# Patient Record
Sex: MALE | Race: WHITE | Employment: OTHER | URBAN - METROPOLITAN AREA
[De-identification: names, ages, dates, MRNs, and addresses within clinical notes are randomized per-mention and may not be internally consistent; named-entity substitution may affect disease eponyms.]

---

## 2017-04-18 ENCOUNTER — OFFICE VISIT (OUTPATIENT)
Dept: INTERNAL MEDICINE CLINIC | Age: 52
End: 2017-04-18

## 2017-04-18 VITALS
WEIGHT: 204.9 LBS | DIASTOLIC BLOOD PRESSURE: 92 MMHG | HEIGHT: 70 IN | SYSTOLIC BLOOD PRESSURE: 135 MMHG | TEMPERATURE: 98.5 F | OXYGEN SATURATION: 97 % | BODY MASS INDEX: 29.33 KG/M2 | HEART RATE: 100 BPM | RESPIRATION RATE: 20 BRPM

## 2017-04-18 DIAGNOSIS — M54.2 CHRONIC NECK PAIN: ICD-10-CM

## 2017-04-18 DIAGNOSIS — G89.29 CHRONIC NECK PAIN: ICD-10-CM

## 2017-04-18 DIAGNOSIS — M48.02 CERVICAL SPINAL STENOSIS: Primary | ICD-10-CM

## 2017-04-18 RX ORDER — OXYCODONE AND ACETAMINOPHEN 5; 325 MG/1; MG/1
1 TABLET ORAL
Qty: 120 TAB | Refills: 0 | Status: SHIPPED | OUTPATIENT
Start: 2017-04-18 | End: 2017-05-18 | Stop reason: SDUPTHER

## 2017-04-18 NOTE — PROGRESS NOTES
Chema Velasquez is a 45 y.o. male and presents with Referral Request (pain mgmt)  . Last pain pill was this am and yest. Is planning to see Dr. Bettylou Cheadle and needs a referral for his cervical stenosis. Review of Systems  Constitutional: negative for fevers, chills, anorexia and weight loss  Eyes:   negative for visual disturbance and irritation  ENT:   negative for tinnitus,sore throat,nasal congestion,ear pains. hoarseness  Respiratory:  negative for cough, hemoptysis, dyspnea,wheezing  CV:   negative for chest pain, palpitations, lower extremity edema  GI:   negative for nausea, vomiting, diarrhea, abdominal pain,melena  Endo:               negative for polyuria,polydipsia,polyphagia,heat intolerance  Genitourinary: negative for frequency, dysuria and hematuria  Integument:  negative for rash and pruritus  Hematologic:  negative for easy bruising and gum/nose bleeding  Musculoskel: negative for myalgias, arthralgias, back pain, muscle weakness, joint pain  Neurological:  negative for headaches, dizziness, vertigo, memory problems and gait   Behavl/Psych: negative for feelings of anxiety, depression, mood changes    Past Medical History:   Diagnosis Date    Congestive heart failure, NYHA class II ([facility] [address] 6/2014    stage b    Depression     depression    DJD (degenerative joint disease), cervical     Sleep disorder     no longer needs cpap     Past Surgical History:   Procedure Laterality Date    HX ORTHOPAEDIC       C5-6 disc fusion and thumb surgery     Social History     Social History    Marital status:      Spouse name: N/A    Number of children: N/A    Years of education: N/A     Social History Main Topics    Smoking status: Never Smoker    Smokeless tobacco: Never Used    Alcohol use No      Comment: occassionally    Drug use: No    Sexual activity: Yes     Partners: Female     Birth control/ protection: None     Other Topics Concern    None     Social History Narrative    Lives with wife and 32year old son. Current Outpatient Prescriptions   Medication Sig Dispense Refill    oxyCODONE-acetaminophen (PERCOCET) 5-325 mg per tablet Take 1 Tab by mouth every six (6) hours as needed for Pain. Max Daily Amount: 4 Tabs. 120 Tab 0    lisinopril (PRINIVIL, ZESTRIL) 40 mg tablet Take 1 Tab by mouth daily. 30 Tab 0    carvedilol (COREG) 25 mg tablet Take 2 Tabs by mouth two (2) times daily (with meals). 120 Tab 5    spironolactone (ALDACTONE) 25 mg tablet Take  by mouth daily. Allergies   Allergen Reactions    Shellfish Containing Products Hives and Anaphylaxis    Iodine Swelling       Objective:  Visit Vitals    BP (!) 135/92 (BP 1 Location: Left arm)    Pulse 100    Temp 98.5 °F (36.9 °C) (Oral)    Resp 20    Ht 5' 10\" (1.778 m)    Wt 204 lb 14.4 oz (92.9 kg)    SpO2 97%    BMI 29.4 kg/m2     Physical Exam:   General appearance - alert, well appearing, and in no distress  Mental status - alert, oriented to person, place, and time  Chest - clear to auscultation, no wheezes, rales or rhonchi, symmetric air entry   Heart - normal rate, regular rhythm, normal S1, S2, no murmurs, rubs, clicks or gallops   Abdomen - soft, nontender, nondistended, no masses or organomegaly  Lymph- no adenopathy palpable  Ext-peripheral pulses normal, no pedal edema, no clubbing or cyanosis  Skin-Warm and dry. no hyperpigmentation, vitiligo, or suspicious lesions  Neuro -alert, oriented, normal speech, no focal findings or movement disorder noted    Assessment/Plan:    ICD-10-CM ICD-9-CM    1. Cervical spinal stenosis M48.02 723.0 REFERRAL TO PAIN CLINIC      PAIN MGMT PANEL W/REFL, UR   2.  Chronic neck pain M54.2 723.1 PAIN MGMT PANEL W/REFL, UR    G89.29 338.29 oxyCODONE-acetaminophen (PERCOCET) 5-325 mg per tablet     Orders Placed This Encounter    PAIN MGMT PANEL W/REFL, UR    REFERRAL TO PAIN CLINIC     Referral Priority:   Routine     Referral Type:   Consultation     Referral Reason:   Specialty Services Required     Referral Location:   [facility] Referred to Provider:   Robert Garrett DO    oxyCODONE-acetaminophen (PERCOCET) 5-325 mg per tablet     Sig: Take 1 Tab by mouth every six (6) hours as needed for Pain. Max Daily Amount: 4 Tabs. Dispense:  120 Tab     Refill:  0     Cervical stenosis-Pain med referral given.  was reviewed and medication use is appropriate. Last dosage in  was perc 7.5/325 but our last dosage was perc 5/325mg q6h. Refilled him at our last dose and explained this was a one time fill. Perc 5/325 #120 with no refills.        Follow-up Disposition: Not on File

## 2017-04-18 NOTE — MR AVS SNAPSHOT
Visit Information Date & Time Provider Department Dept. Phone Encounter #  
 4/18/2017  1:30 PM Janelle Busby, [address] [id number] Upcoming Health Maintenance Date Due COLONOSCOPY 1/30/2019 DTaP/Tdap/Td series (2 - Td) 2/10/2026 Allergies as of 4/18/2017  Review Complete On: 4/18/2017 By: Janelle Busby MD  
  
 Severity Noted Reaction Type Reactions Shellfish Containing Products High 07/22/2015    Hives, Anaphylaxis Iodine  07/27/2010    Swelling Current Immunizations  Reviewed on 2/10/2016 Name Date Influenza Vaccine Intradermal PF 10/14/2015 Pneumococcal Vaccine (Unspecified Type) 8/10/2014 Tdap 2/10/2016 Not reviewed this visit You Were Diagnosed With   
  
 Codes Comments Cervical spinal stenosis    -  Primary ICD-10-CM: M48.02 
ICD-9-CM: 723.0 Chronic neck pain     ICD-10-CM: M54.2, G89.29 ICD-9-CM: 723.1, 338.29 Vitals BP Pulse Temp Resp Height(growth percentile) Weight(growth percentile) (!) 135/92 (BP 1 Location: Left arm) 100 98.5 °F (36.9 °C) (Oral) 20 5' 10\" (1.778 m) 204 lb 14.4 oz (92.9 kg) SpO2 BMI Smoking Status 97% 29.4 kg/m2 Never Smoker Vitals History BMI and BSA Data Body Mass Index Body Surface Area  
 29.4 kg/m 2 2.14 m 2 Preferred Pharmacy Pharmacy Name Phone [address], [address] [place] [address] [phone] Your Updated Medication List  
  
   
This list is accurate as of: 4/18/17  3:09 PM.  Always use your most recent med list.  
  
  
  
  
 carvedilol 25 mg tablet Commonly known as:  Neto Flatten Take 2 Tabs by mouth two (2) times daily (with meals). lisinopril 40 mg tablet Commonly known as:  Hserman Mash Take 1 Tab by mouth daily. oxyCODONE-acetaminophen 5-325 mg per tablet Commonly known as:  PERCOCET  
 Take 1 Tab by mouth every six (6) hours as needed for Pain. Max Daily Amount: 4 Tabs. spironolactone 25 mg tablet Commonly known as:  ALDACTONE Take  by mouth daily. Prescriptions Printed Refills  
 oxyCODONE-acetaminophen (PERCOCET) 5-325 mg per tablet 0 Sig: Take 1 Tab by mouth every six (6) hours as needed for Pain. Max Daily Amount: 4 Tabs. Class: Print Route: Oral  
  
We Performed the Following PAIN MGMT PANEL W/REFL, UR [[id number] Custom] REFERRAL TO PAIN CLINIC [QVO15 Custom] Comments:  
 Please evaluate patient for cervical stenosis. Referral Information Referral ID Referred By Referred To  
  
 [id number] Marisol ROMERO [facility] [address] Visits Status Start Date End Date 1 New Request 4/18/17 4/18/18 If your referral has a status of pending review or denied, additional information will be sent to support the outcome of this decision. Introducing [facility] & HEALTH SERVICES! [facility] introduces combionic patient portal. Now you can access parts of your medical record, email your doctor's office, and request medication refills online. 1. In your internet browser, go to [email or web address] 2. Click on the First Time User? Click Here link in the Sign In box. You will see the New Member Sign Up page. 3. Enter your combionic Access Code exactly as it appears below. You will not need to use this code after youve completed the sign-up process. If you do not sign up before the expiration date, you must request a new code. · combionic Access Code: [email or web address] Expires: 7/17/2017  3:06 PM 
 
4. Enter the last four digits of your Social Security Number (xxxx) and Date of Birth (mm/dd/yyyy) as indicated and click Submit. You will be taken to the next sign-up page. 5. Create a combionic ID.  This will be your combionic login ID and cannot be changed, so think of one that is secure and easy to remember. 6. Create a ThisNext password. You can change your password at any time. 7. Enter your Password Reset Question and Answer. This can be used at a later time if you forget your password. 8. Enter your e-mail address. You will receive e-mail notification when new information is available in [address] 9. Click Sign Up. You can now view and download portions of your medical record. 10. Click the Download Summary menu link to download a portable copy of your medical information. If you have questions, please visit the Frequently Asked Questions section of the ThisNext website. Remember, ThisNext is NOT to be used for urgent needs. For medical emergencies, dial 911. Now available from your iPhone and Android! Please provide this summary of care documentation to your next provider. Your primary care clinician is listed as Taj Rule. If you have any questions after today's visit, please call [phone].

## 2017-04-18 NOTE — PROGRESS NOTES
1. Have you been to the ER, urgent care clinic since your last visit? Hospitalized since your last visit? No    2. Have you seen or consulted any other health care providers outside of the [facility] since your last visit? Include any pap smears or colon screening.  Yes When: Dr. Mya Klein [facility] ([place])     Patient states pain of 7 in neck and right knee, last taken something for pain this am.    Patient is here for:  Chief Complaint   Patient presents with   Willian Downey Referral Request     pain mgmt

## 2017-04-24 ENCOUNTER — TELEPHONE (OUTPATIENT)
Dept: INTERNAL MEDICINE CLINIC | Age: 52
End: 2017-04-24

## 2017-04-24 LAB
AMPHETAMINES UR QL SCN: NEGATIVE NG/ML
BARBITURATES UR QL SCN: NEGATIVE NG/ML
BENZODIAZ UR QL SCN: NEGATIVE NG/ML
BZE UR QL SCN: NEGATIVE NG/ML
CANNABINOIDS UR QL SCN: NEGATIVE NG/ML
CREAT UR-MCNC: 158 MG/DL (ref 20–300)
FENTANYL+NORFENTANYL UR QL SCN: NEGATIVE PG/ML
MEPERIDINE UR QL: NEGATIVE NG/ML
METHADONE UR QL SCN: NEGATIVE NG/ML
OPIATES UR QL SCN: NEGATIVE NG/ML
OXYCODONE+OXYMORPHONE UR QL SCN: POSITIVE
PCP UR QL: NEGATIVE NG/ML
PH UR: 5.8 [PH] (ref 4.5–8.9)
PLEASE NOTE:, 733157: ABNORMAL
PROPOXYPH UR QL SCN: NEGATIVE NG/ML
SP GR UR: 1.03
TRAMADOL UR QL SCN: NEGATIVE NG/ML

## 2017-04-24 NOTE — TELEPHONE ENCOUNTER
Pt's wife, Odell Lazo called stating  The specialist you referred him to  Does not do pain management. He is for spine intervention. They did find another physician, Dr. Rajendra Gonzalez who needs ov notes for last 3 months on this pt. As well as MRI and CT results. Dr. Rip Rosales ph# [phone] and fax# .  Pt # [phone]

## 2017-04-26 NOTE — TELEPHONE ENCOUNTER
Writer fax notes/MRI/CT scan results to DR. Dannie Hernández office @ [phone]/call patient @ [phone] unable to no answer unable to reach patient @ this time.

## 2017-05-18 ENCOUNTER — OFFICE VISIT (OUTPATIENT)
Dept: INTERNAL MEDICINE CLINIC | Age: 52
End: 2017-05-18

## 2017-05-18 VITALS
WEIGHT: 202 LBS | RESPIRATION RATE: 20 BRPM | HEIGHT: 70 IN | SYSTOLIC BLOOD PRESSURE: 116 MMHG | DIASTOLIC BLOOD PRESSURE: 85 MMHG | OXYGEN SATURATION: 96 % | TEMPERATURE: 98 F | HEART RATE: 90 BPM | BODY MASS INDEX: 28.92 KG/M2

## 2017-05-18 DIAGNOSIS — G47.00 INSOMNIA, UNSPECIFIED TYPE: ICD-10-CM

## 2017-05-18 DIAGNOSIS — G89.29 CHRONIC NECK PAIN: ICD-10-CM

## 2017-05-18 DIAGNOSIS — M54.2 CHRONIC NECK PAIN: ICD-10-CM

## 2017-05-18 DIAGNOSIS — M48.02 CERVICAL SPINAL STENOSIS: Primary | ICD-10-CM

## 2017-05-18 RX ORDER — OXYCODONE AND ACETAMINOPHEN 5; 325 MG/1; MG/1
1 TABLET ORAL
Qty: 120 TAB | Refills: 0 | Status: SHIPPED | OUTPATIENT
Start: 2017-05-18 | End: 2017-05-18 | Stop reason: SDUPTHER

## 2017-05-18 RX ORDER — OXYCODONE AND ACETAMINOPHEN 5; 325 MG/1; MG/1
1 TABLET ORAL
Qty: 120 TAB | Refills: 0 | Status: SHIPPED | OUTPATIENT
Start: 2017-05-18 | End: 2017-07-14

## 2017-05-18 NOTE — PROGRESS NOTES
Nany Shelton is a 45 y.o. male and presents with Medication Refill; Sleep Problem; and Referral Follow Up  . Last pain pill was this am and yest. He was supposed to see Dr. Trip Cai but he doesn't do pain meds. They made an appt with Dr. Blane Rendon but it was too far out. So now htye will be seeing Dr. Waylon Manzanares next week. They need 3 months of records and MRI reports. Has trouble staying asleep. Tried Unisom which helps some but hangs him over the next day. Negative stress test last week. Review of Systems  Constitutional: negative for fevers, chills, anorexia and weight loss  Eyes:   negative for visual disturbance and irritation  ENT:   negative for tinnitus,sore throat,nasal congestion,ear pains. hoarseness  Respiratory:  negative for cough, hemoptysis, dyspnea,wheezing  CV:   negative for chest pain, palpitations, lower extremity edema  GI:   negative for nausea, vomiting, diarrhea, abdominal pain,melena  Endo:               negative for polyuria,polydipsia,polyphagia,heat intolerance  Genitourinary: negative for frequency, dysuria and hematuria  Integument:  negative for rash and pruritus  Hematologic:  negative for easy bruising and gum/nose bleeding  Musculoskel: negative for myalgias, arthralgias, back pain, muscle weakness, joint pain  Neurological:  negative for headaches, dizziness, vertigo, memory problems and gait   Behavl/Psych: negative for feelings of anxiety, depression, mood changes    Past Medical History:   Diagnosis Date    Congestive heart failure, NYHA class II ([facility] [address] 6/2014    stage b    Depression     depression    DJD (degenerative joint disease), cervical     Sleep disorder     no longer needs cpap     Past Surgical History:   Procedure Laterality Date    HX ORTHOPAEDIC       C5-6 disc fusion and thumb surgery     Social History     Social History    Marital status:      Spouse name: N/A    Number of children: N/A    Years of education: N/A     Social History Main Topics  Smoking status: Never Smoker    Smokeless tobacco: Never Used    Alcohol use No      Comment: occassionally    Drug use: No    Sexual activity: Yes     Partners: Female     Birth control/ protection: None     Other Topics Concern    None     Social History Narrative    Lives with wife and 32year old son. Current Outpatient Prescriptions   Medication Sig Dispense Refill    oxyCODONE-acetaminophen (PERCOCET) 5-325 mg per tablet Take 1 Tab by mouth every six (6) hours as needed for Pain. Max Daily Amount: 4 Tabs. 120 Tab 0    lisinopril (PRINIVIL, ZESTRIL) 40 mg tablet Take 1 Tab by mouth daily. 30 Tab 0    carvedilol (COREG) 25 mg tablet Take 2 Tabs by mouth two (2) times daily (with meals). 120 Tab 5    spironolactone (ALDACTONE) 25 mg tablet Take  by mouth daily. Allergies   Allergen Reactions    Shellfish Containing Products Hives and Anaphylaxis    Iodine Swelling       Objective:  Visit Vitals    /85 (BP 1 Location: Right arm, BP Patient Position: Sitting)    Pulse 90    Temp 98 °F (36.7 °C) (Oral)    Resp 20    Ht 5' 10\" (1.778 m)    Wt 202 lb (91.6 kg)    SpO2 96%    BMI 28.98 kg/m2     Physical Exam:   General appearance - alert, well appearing, and in no distress  Mental status - alert, oriented to person, place, and time  Chest - clear to auscultation, no wheezes, rales or rhonchi, symmetric air entry   Heart - normal rate, regular rhythm, normal S1, S2, no murmurs, rubs, clicks or gallops   Abdomen - soft, nontender, nondistended, no masses or organomegaly  Lymph- no adenopathy palpable  Ext-peripheral pulses normal, no pedal edema, no clubbing or cyanosis  Skin-Warm and dry.  no hyperpigmentation, vitiligo, or suspicious lesions  Neuro -alert, oriented, normal speech, no focal findings or movement disorder noted      Results for orders placed or performed in visit on 04/18/17   PAIN MGMT PANEL W/REFL, UR   Result Value Ref Range    Amphetamine Screen, urine Negative Uqwroj=8277 ng/mL    Barbiturates Screen, urine Negative Fqziag=526 ng/mL    Benzodiazepines Screen, urine Negative Onbcae=034 ng/mL    Cannabinoid Screen, urine Negative Cutoff=20 ng/mL    Cocaine Metab. Screen, urine Negative Yhovoa=658 ng/mL    Opiate Screen, urine Negative Kukzsh=304 ng/mL    Oxycodone/Oxymorphone, urine Positive (A) Szwolw=588    Phencyclidine Screen, urine Negative Cutoff=25 ng/mL    Methadone Screen, urine Negative Bmscev=657 ng/mL    Propoxyphene Screen, urine Negative Fvsppr=494 ng/mL    Meperidine screen Negative Taxbjj=837 ng/mL    FENTANYL, URINE Negative Bvwcyx=4839 pg/mL    Tramadol Screen, urine Negative Mnxlgj=163 ng/mL    Creatinine, urine 158.0 20.0 - 300.0 mg/dL    Specific Gravity 1.029     pH, urine 5.8 4.5 - 8.9    Please Note Comment        Assessment/Plan:    ICD-10-CM ICD-9-CM    1. Cervical spinal stenosis M48.02 723.0 PAIN MGMT PANEL W/REFL, UR      REFERRAL TO PAIN CLINIC      PAIN MGMT PANEL W/REFL, UR   2. Chronic neck pain M54.2 723.1 PAIN MGMT PANEL W/REFL, UR    G89.29 338.29 REFERRAL TO PAIN CLINIC      oxyCODONE-acetaminophen (PERCOCET) 5-325 mg per tablet      PAIN MGMT PANEL W/REFL, UR      DISCONTINUED: oxyCODONE-acetaminophen (PERCOCET) 5-325 mg per tablet   3. Insomnia, unspecified type G47.00 780.52      Orders Placed This Encounter    PAIN MGMT PANEL W/REFL, UR    PAIN MGMT PANEL W/REFL, UR    REFERRAL TO PAIN CLINIC     Referral Priority:   Routine     Referral Type:   Consultation     Referral Reason:   Specialty Services Required    DISCONTD: oxyCODONE-acetaminophen (PERCOCET) 5-325 mg per tablet     Sig: Take 1 Tab by mouth every six (6) hours as needed for Pain. Max Daily Amount: 4 Tabs. Dispense:  120 Tab     Refill:  0    oxyCODONE-acetaminophen (PERCOCET) 5-325 mg per tablet     Sig: Take 1 Tab by mouth every six (6) hours as needed for Pain. Max Daily Amount: 4 Tabs.      Dispense:  120 Tab     Refill:  0     Cervical stenosis- pain clinic referral. Did let pt know this was his last pain rx from this clinic due to the fact that I am leaving the practice. UDS.  was reviewed and medication use is appropriate.   Insomnia- try Tylenol PM    Follow-up Disposition: Not on File

## 2017-05-18 NOTE — MR AVS SNAPSHOT
Visit Information Date & Time Provider Department Dept. Phone Encounter #  
 5/18/2017  8:45  [address], [address] [id number] Upcoming Health Maintenance Date Due INFLUENZA AGE 9 TO ADULT 8/1/2017 COLONOSCOPY 1/30/2019 DTaP/Tdap/Td series (2 - Td) 2/10/2026 Allergies as of 5/18/2017  Review Complete On: 5/18/2017 By: Rell Evans LPN Severity Noted Reaction Type Reactions Shellfish Containing Products High 07/22/2015    Hives, Anaphylaxis Iodine  07/27/2010    Swelling Current Immunizations  Reviewed on 2/10/2016 Name Date Influenza Vaccine Intradermal PF 10/14/2015 Pneumococcal Vaccine (Unspecified Type) 8/10/2014 Tdap 2/10/2016 Not reviewed this visit You Were Diagnosed With   
  
 Codes Comments Cervical spinal stenosis    -  Primary ICD-10-CM: M48.02 
ICD-9-CM: 723.0 Chronic neck pain     ICD-10-CM: M54.2, G89.29 ICD-9-CM: 723.1, 338.29 Insomnia, unspecified type     ICD-10-CM: G47.00 ICD-9-CM: 780.52 Vitals BP Pulse Temp Resp Height(growth percentile) Weight(growth percentile) 116/85 (BP 1 Location: Right arm, BP Patient Position: Sitting) 90 98 °F (36.7 °C) (Oral) 20 5' 10\" (1.778 m) 202 lb (91.6 kg) SpO2 BMI Smoking Status 96% 28.98 kg/m2 Never Smoker Vitals History BMI and BSA Data Body Mass Index Body Surface Area  
 28.98 kg/m 2 2.13 m 2 Preferred Pharmacy Pharmacy Name Phone [address], [address] [place] [address] [phone] Your Updated Medication List  
  
   
This list is accurate as of: 5/18/17  9:21 AM.  Always use your most recent med list.  
  
  
  
  
 carvedilol 25 mg tablet Commonly known as:  Savi Simper Take 2 Tabs by mouth two (2) times daily (with meals). lisinopril 40 mg tablet Commonly known as:  Tanda Limerick Take 1 Tab by mouth daily. oxyCODONE-acetaminophen 5-325 mg per tablet Commonly known as:  PERCOCET Take 1 Tab by mouth every six (6) hours as needed for Pain. Max Daily Amount: 4 Tabs. spironolactone 25 mg tablet Commonly known as:  ALDACTONE Take  by mouth daily. Prescriptions Printed Refills  
 oxyCODONE-acetaminophen (PERCOCET) 5-325 mg per tablet 0 Sig: Take 1 Tab by mouth every six (6) hours as needed for Pain. Max Daily Amount: 4 Tabs. Class: Print Route: Oral  
  
We Performed the Following PAIN MGMT PANEL W/REFL, UR [[id number] Custom] PAIN MGMT PANEL W/REFL, UR [[id number] Custom] REFERRAL TO PAIN CLINIC [BCY79 Custom] Comments:  
 Please evaluate patient for cervical stenosis. Referral Information Referral ID Referred By Referred To  
  
 [id number] Abdiel Downing Not Available Visits Status Start Date End Date 1 New Request 5/18/17 5/18/18 If your referral has a status of pending review or denied, additional information will be sent to support the outcome of this decision. Introducing [facility] & HEALTH SERVICES! Toribio Nyhan introduces [email or web address] patient portal. Now you can access parts of your medical record, email your doctor's office, and request medication refills online. 1. In your internet browser, go to [email or web address] 2. Click on the First Time User? Click Here link in the Sign In box. You will see the New Member Sign Up page. 3. Enter your [email or web address] Access Code exactly as it appears below. You will not need to use this code after youve completed the sign-up process. If you do not sign up before the expiration date, you must request a new code. · [email or web address] Access Code: [email or web address] Expires: 7/17/2017  3:06 PM 
 
4. Enter the last four digits of your Social Security Number (xxxx) and Date of Birth (mm/dd/yyyy) as indicated and click Submit. You will be taken to the next sign-up page. 5. Create a Ostial Solutions ID. This will be your [employer] login ID and cannot be changed, so think of one that is secure and easy to remember. 6. Create a [employer] password. You can change your password at any time. 7. Enter your Password Reset Question and Answer. This can be used at a later time if you forget your password. 8. Enter your e-mail address. You will receive e-mail notification when new information is available in [address] 9. Click Sign Up. You can now view and download portions of your medical record. 10. Click the Download Summary menu link to download a portable copy of your medical information. If you have questions, please visit the Frequently Asked Questions section of the [employer] website. Remember, [employer] is NOT to be used for urgent needs. For medical emergencies, dial 911. Now available from your iPhone and Android! Please provide this summary of care documentation to your next provider. Your primary care clinician is listed as Leia Ernst. If you have any questions after today's visit, please call [phone].

## 2017-05-18 NOTE — PROGRESS NOTES
1. Have you been to the ER, urgent care clinic since your last visit? Hospitalized since your last visit? No.    2. Have you seen or consulted any other health care providers outside of the [address] [place] [address] since your last visit? Include any pap smears or colon screening. No.  Had pain med this morning.

## 2017-05-25 LAB
AMPHETAMINES UR QL SCN: NEGATIVE NG/ML
BARBITURATES UR QL SCN: NEGATIVE NG/ML
BENZODIAZ UR QL SCN: NEGATIVE NG/ML
BZE UR QL SCN: NEGATIVE NG/ML
CANNABINOIDS UR QL SCN: NEGATIVE NG/ML
CREAT UR-MCNC: 63.9 MG/DL (ref 20–300)
FENTANYL+NORFENTANYL UR QL SCN: NEGATIVE PG/ML
MEPERIDINE UR QL: NEGATIVE NG/ML
METHADONE UR QL SCN: NEGATIVE NG/ML
OPIATES UR QL SCN: NEGATIVE NG/ML
OXYCODONE+OXYMORPHONE UR QL SCN: POSITIVE
PCP UR QL: NEGATIVE NG/ML
PH UR: 5.8 [PH] (ref 4.5–8.9)
PLEASE NOTE:, 733157: ABNORMAL
PROPOXYPH UR QL SCN: NEGATIVE NG/ML
SP GR UR: 1.01
TRAMADOL UR QL SCN: NEGATIVE NG/ML

## 2017-07-14 ENCOUNTER — OFFICE VISIT (OUTPATIENT)
Dept: INTERNAL MEDICINE CLINIC | Age: 52
End: 2017-07-14

## 2017-07-14 VITALS
RESPIRATION RATE: 18 BRPM | SYSTOLIC BLOOD PRESSURE: 113 MMHG | OXYGEN SATURATION: 97 % | HEIGHT: 70 IN | TEMPERATURE: 97.3 F | BODY MASS INDEX: 29.42 KG/M2 | WEIGHT: 205.5 LBS | DIASTOLIC BLOOD PRESSURE: 52 MMHG | HEART RATE: 90 BPM

## 2017-07-14 DIAGNOSIS — Z00.00 MEDICARE ANNUAL WELLNESS VISIT, SUBSEQUENT: Primary | ICD-10-CM

## 2017-07-14 RX ORDER — OXYCODONE AND ACETAMINOPHEN 10; 325 MG/1; MG/1
1 TABLET ORAL
COMMUNITY
Start: 2017-06-01

## 2017-07-14 RX ORDER — SPIRONOLACTONE 25 MG/1
TABLET ORAL
COMMUNITY
Start: 2017-05-05

## 2017-07-14 RX ORDER — OXYCODONE AND ACETAMINOPHEN 7.5; 325 MG/1; MG/1
TABLET ORAL
COMMUNITY
Start: 2017-04-17 | End: 2017-07-14

## 2017-07-14 NOTE — MR AVS SNAPSHOT
Visit Information Date & Time Provider Department Dept. Phone Encounter #  
 7/14/2017 10:15 AM Bindu Jason [facility], [address] [id number] Follow-up Instructions Return in about 6 months (around 1/14/2018). Upcoming Health Maintenance Date Due  
 MEDICARE YEARLY EXAM 11/30/1982 INFLUENZA AGE 9 TO ADULT 8/1/2017 COLONOSCOPY 1/30/2019 DTaP/Tdap/Td series (2 - Td) 2/10/2026 Allergies as of 7/14/2017  Review Complete On: 7/14/2017 By: Day Mathias LPN Severity Noted Reaction Type Reactions Shellfish Containing Products High 07/22/2015    Hives, Anaphylaxis Iodine  07/27/2010    Swelling Current Immunizations  Reviewed on 2/10/2016 Name Date Influenza Vaccine Intradermal PF 10/14/2015 Pneumococcal Vaccine (Unspecified Type) 8/10/2014 Tdap 2/10/2016 Not reviewed this visit You Were Diagnosed With   
  
 Codes Comments Medicare annual wellness visit, subsequent    -  Primary ICD-10-CM: Z00.00 ICD-9-CM: V70.0 Vitals BP Pulse Temp Resp Height(growth percentile) Weight(growth percentile) 113/52 (BP 1 Location: Right arm, BP Patient Position: Sitting) 90 97.3 °F (36.3 °C) (Oral) 18 5' 10\" (1.778 m) 205 lb 8 oz (93.2 kg) SpO2 BMI Smoking Status 97% 29.49 kg/m2 Never Smoker Vitals History BMI and BSA Data Body Mass Index Body Surface Area  
 29.49 kg/m 2 2.15 m 2 Preferred Pharmacy Pharmacy Name Phone [address], [address] [place] [address] [place] [phone] Your Updated Medication List  
  
   
This list is accurate as of: 7/14/17 10:56 AM.  Always use your most recent med list.  
  
  
  
  
 carvedilol 25 mg tablet Commonly known as:  Gisell Ni Take 2 Tabs by mouth two (2) times daily (with meals). lisinopril 40 mg tablet Commonly known as:  Dorathy Massed Take 1 Tab by mouth daily. oxyCODONE-acetaminophen  mg per tablet Commonly known as:  PERCOCET 10 Follow-up Instructions Return in about 6 months (around 1/14/2018). Patient Instructions Medicare Wellness Visit, Male The best way to live healthy is to have a healthy lifestyle by eating a well-balanced diet, exercising regularly, limiting alcohol and stopping smoking. Regular physical exams and screening tests are another way to keep healthy. Preventive exams provided by your health care provider can find health problems before they become diseases or illnesses. Preventive services including immunizations, screening tests, monitoring and exams can help you take care of your own health. All people over age 72 should have a pneumovax  and and a prevnar shot to prevent pneumonia. These are once in a lifetime unless you and your provider decide differently. All people over 65 should have a yearly flu shot and a tetanus vaccine every 10 years. Screening for diabetes mellitus with a blood sugar test should be done every year. Glaucoma is a disease of the eye due to increased ocular pressure that can lead to blindness and it should be done every year by an eye professional. 
 
Cardiovascular screening tests that check for elevated lipids (fatty part of blood) which can lead to heart disease and strokes should be done every 5 years. Colorectal screening that evaluates for blood or polyps in your colon should be done yearly as a stool test or every five years as a flexible sigmoidoscope or every 10 years as a colonoscopy up to age 76. Men up to age 76 may need a screening blood test for prostate cancer at certain intervals, depending on their personal and family history. This decision is between the patient and his provider. If you have been a smoker or had family history of abdominal aortic aneurysms, you and your provider may decide to schedule an ultrasound test of your aorta. Hepatitis C screening is also recommended for anyone born between [birth year] through [address]. A shingles vaccine is also recommended once in a lifetime after age 61. Your Medicare Wellness Exam is recommended annually. Here is a list of your current Health Maintenance items with a due date: 
Health Maintenance Due Topic Date Due  
 Annual Well Visit  11/30/1982 Introducing [facility] SERVICES! [facility] introduces [employer] patient portal. Now you can access parts of your medical record, email your doctor's office, and request medication refills online. 1. In your internet browser, go to [email or web address] 2. Click on the First Time User? Click Here link in the Sign In box. You will see the New Member Sign Up page. 3. Enter your [employer] Access Code exactly as it appears below. You will not need to use this code after youve completed the sign-up process. If you do not sign up before the expiration date, you must request a new code. · [employer] Access Code: [email or web address] Expires: 7/17/2017  3:06 PM 
 
4. Enter the last four digits of your Social Security Number (xxxx) and Date of Birth (mm/dd/yyyy) as indicated and click Submit. You will be taken to the next sign-up page. 5. Create a [employer] ID. This will be your [employer] login ID and cannot be changed, so think of one that is secure and easy to remember. 6. Create a [employer] password. You can change your password at any time. 7. Enter your Password Reset Question and Answer. This can be used at a later time if you forget your password. 8. Enter your e-mail address. You will receive e-mail notification when new information is available in [address] 9. Click Sign Up. You can now view and download portions of your medical record. 10. Click the Download Summary menu link to download a portable copy of your medical information.  
 
If you have questions, please visit the Frequently Asked Questions section of the [employer]. Remember, [employer] is NOT to be used for urgent needs. For medical emergencies, dial 911. Now available from your iPhone and Android! Please provide this summary of care documentation to your next provider. Your primary care clinician is listed as Candance Scotland. If you have any questions after today's visit, please call [phone].

## 2017-07-14 NOTE — PROGRESS NOTES
Pt here for   Chief Complaint   Patient presents with    Follow-up     DMV Form    Annual Wellness Visit         1. Have you been to the ER, urgent care clinic since your last visit? Hospitalized since your last visit? No    2. Have you seen or consulted any other health care providers outside of the [address] [place] [address] since your last visit? Include any pap smears or colon screening.  No     Pt c/o pain 6 of 10, pt states chronic he sees pain management    PHQ over the last two weeks 3/30/2016   Little interest or pleasure in doing things Not at all   Feeling down, depressed or hopeless Not at all   Total Score PHQ 2 0

## 2017-07-14 NOTE — PATIENT INSTRUCTIONS

## 2017-07-14 NOTE — PROGRESS NOTES
This is a Subsequent Medicare Annual Wellness Visit providing Personalized Prevention Plan Services (PPPS) (Performed 12 months after initial AWV and PPPS )    I have reviewed the patient's medical history in detail and updated the computerized patient record. History     Past Medical History:   Diagnosis Date    Congestive heart failure, NYHA class II ([facility] [address] 6/2014    stage b    Depression     depression    DJD (degenerative joint disease), cervical     Sleep disorder     no longer needs cpap      Past Surgical History:   Procedure Laterality Date    HX ORTHOPAEDIC       C5-6 disc fusion and thumb surgery     Current Outpatient Prescriptions   Medication Sig Dispense Refill    oxyCODONE-acetaminophen (PERCOCET 10)  mg per tablet       lisinopril (PRINIVIL, ZESTRIL) 40 mg tablet Take 1 Tab by mouth daily. 30 Tab 0    carvedilol (COREG) 25 mg tablet Take 2 Tabs by mouth two (2) times daily (with meals). 120 Tab 5    spironolactone (ALDACTONE) 25 mg tablet        Allergies   Allergen Reactions    Shellfish Containing Products Hives and Anaphylaxis    Iodine Swelling     Family History   Problem Relation Age of Onset    Heart Disease Mother     Heart Disease Brother      Social History   Substance Use Topics    Smoking status: Never Smoker    Smokeless tobacco: Never Used    Alcohol use No      Comment: occassionally     Patient Active Problem List   Diagnosis Code    CHF (NYHA class I, ACC/AHA stage B) ([facility]) I50.9    Insomnia G47.00    Cervical spinal stenosis M48.02       Depression Risk Factor Screening:     PHQ over the last two weeks 7/14/2017   Little interest or pleasure in doing things Not at all   Feeling down, depressed or hopeless Not at all   Total Score PHQ 2 0     Alcohol Risk Factor Screening: On any occasion during the past 3 months, have you had more than 4 drinks containing alcohol? No    Do you average more than 14 drinks per week?   No        Functional Ability and Level of Safety:     Hearing Loss   none    Activities of Daily Living   Self-care. Requires assistance with: no ADLs    Fall Risk   Fall Risk Assessment, last 12 mths 7/14/2017   Able to walk? Yes   Fall in past 12 months? No     Abuse Screen   Patient is not abused    Review of Systems   A comprehensive review of systems was negative except for that written in the HPI. He is followed by cardiology Dr. Chasity Gallagher, Dr. Julia Edwards pain managment    He asks for form completion to have 's license re-instated. It was held previously because last summer he was having syncopal episodes - this was believed to be related to dehydration (he was taking lasix, working in the heat and not drinking fluids). Lasix has been stopped. He has had no symptoms since. Physical Examination     Evaluation of Cognitive Function:  Mood/affect:  happy  Appearance: age appropriate  Family member/caregiver input: n/a    Gen: Oriented to person, place and time and well-developed, well-nourished and in no distress. HEENT:    Head: normocephalic and atraumatic. Eyes:  EOM are normal. Pupils equal and round. Neck:  Normal range of motion. Neck supple. Cardiovascular: normal rate, regular rhythm and normal heart sounds. Pulmonary/Chest:  Effort normal and breath sounds normal.  No respiratory distress. No wheezes, no rales. Abdominal: soft, normal  bowel sounds. Musculoskeletal:  No edema, no tenderness. No calf tenderness or edema. Neurological:  Alert, oriented to person, place and time. Skin: skin is warm and dry. Patient Care Team:  Froilan Silva MD as PCP - [facility])    Advice/Referrals/Counseling   Education and counseling provided:  Are appropriate based on today's review and evaluation      Assessment/Plan   Follow-up Disposition:  Return in about 6 months (around 1/14/2018). He asks for [facility] form to be completed to have his license re-instated.  It was previously suspended after syncopal episode in July 2016. He needs to be syncopal free x 6 months. Per MD notes and ED notes from [facility] it was believed to be related to dehydration and lasix. His medications were changed. He reports no syncopal episodes since then. 1. Medicare annual wellness visit, subsequent              I  have discussed the diagnosis with the patient and/or gaurdian and the intended treatment plan as seen in the above orders. Patient and/or gaurdian has provided input and agrees with goals. The patient has received an after-visit summary and questions were answered concerning future plans. I have discussed medication side effects and warnings with the patient and/or gaurdian as well.

## 2017-07-19 ENCOUNTER — TELEPHONE (OUTPATIENT)
Dept: INTERNAL MEDICINE CLINIC | Age: 52
End: 2017-07-19

## 2017-07-19 NOTE — TELEPHONE ENCOUNTER
Left message    Calling bc he dropped off form to have his license reinstated after fainting spell last year. Upon looking more at the form, it is asking for a more detailed neuro exam than was done when he was here recently for his medicare wellness visit. Unfortunately in order to complete form, he needs an appt so that these questions can be filled out - unable to gather the information from Dr. Hetal Parks last note.

## 2017-07-24 ENCOUNTER — OFFICE VISIT (OUTPATIENT)
Dept: INTERNAL MEDICINE CLINIC | Age: 52
End: 2017-07-24

## 2017-07-24 VITALS
DIASTOLIC BLOOD PRESSURE: 94 MMHG | BODY MASS INDEX: 29.22 KG/M2 | SYSTOLIC BLOOD PRESSURE: 134 MMHG | HEART RATE: 100 BPM | OXYGEN SATURATION: 95 % | WEIGHT: 204.1 LBS | HEIGHT: 70 IN | RESPIRATION RATE: 18 BRPM | TEMPERATURE: 98.9 F

## 2017-07-24 DIAGNOSIS — Z02.89 ENCOUNTER FOR COMPLETION OF FORM WITH PATIENT: Primary | ICD-10-CM

## 2017-07-24 DIAGNOSIS — Z11.59 SCREENING FOR VIRAL DISEASE: ICD-10-CM

## 2017-07-24 DIAGNOSIS — I10 ESSENTIAL HYPERTENSION: ICD-10-CM

## 2017-07-24 NOTE — PROGRESS NOTES
Pt here for   Chief Complaint   Patient presents with    Form Completion     1. Have you been to the ER, urgent care clinic since your last visit? Hospitalized since your last visit? No    2. Have you seen or consulted any other health care providers outside of the [address] [place] [address] since your last visit? Include any pap smears or colon screening.  No     Pt c/o pain 5 of 10, Pt took pain meds today    PHQ over the last two weeks 7/14/2017   Little interest or pleasure in doing things Not at all   Feeling down, depressed or hopeless Not at all   Total Score PHQ 2 0

## 2017-07-24 NOTE — PROGRESS NOTES
Subjective: (As above and below)     Chief Complaint   Patient presents with    Form Completion     Alyssia MARTINEZ Zackary Snellen is a 45y.o. year old male who presents for return for form completion. As last visit, he needed form to re-instate drivers license after it was suspended following a syncopal episode 1 year ago which was believed to be from a combination of lasix, heat and dehydration. He states he has had NO syncopal episodes since. His form, however, had more detailed questions regarding cardiac/neuro function and so he was brought back. Reviewed PmHx, RxHx, FmHx, SocHx, AllgHx and updated in chart. Family History   Problem Relation Age of Onset    Heart Disease Mother     Heart Disease Brother        Past Medical History:   Diagnosis Date    Congestive heart failure, NYHA class II ([facility] [address] 6/2014    stage b    Depression     depression    DJD (degenerative joint disease), cervical     Sleep disorder     no longer needs cpap      Social History     Social History    Marital status:      Spouse name: N/A    Number of children: N/A    Years of education: N/A     Social History Main Topics    Smoking status: Never Smoker    Smokeless tobacco: Never Used    Alcohol use No      Comment: occassionally    Drug use: No    Sexual activity: Yes     Partners: Female     Birth control/ protection: None     Other Topics Concern    None     Social History Narrative    Lives with wife and 32year old son. Current Outpatient Prescriptions   Medication Sig    oxyCODONE-acetaminophen (PERCOCET 10)  mg per tablet 1 Tab two (2) times daily as needed.  spironolactone (ALDACTONE) 25 mg tablet     lisinopril (PRINIVIL, ZESTRIL) 40 mg tablet Take 1 Tab by mouth daily.  carvedilol (COREG) 25 mg tablet Take 2 Tabs by mouth two (2) times daily (with meals). No current facility-administered medications for this visit.         Review of Systems:   Constitutional:    Negative for fever and chills, negative diaphoresis. HEENT:              Negative for neck pain and stiffness. Eyes:                  Negative for visual disturbance, itching, redness or discharge. Respiratory:        Negative for cough and shortness of breath. Cardiovascular:  Negative for chest pain and palpitations. Gastrointestinal: Negative for nausea, vomiting, abdominal pain, diarrhea or constipation. Genitourinary:     Negative for dysuria and frequency. Musculoskeletal: Negative for falls, tenderness and swelling. Skin:                    Negative for rash, masses or lesions. Neurological:       Negative for dizzyness, seizure, loss of consciousness, weakness and numbness. Objective:     Vitals:    07/24/17 1025 07/24/17 1028 07/24/17 1302   BP: (!) 88/60 (!) 136/120 (!) 134/94   Pulse: 100     Resp: 18     Temp: 98.9 °F (37.2 °C)     TempSrc: Oral     SpO2: 95%     Weight: 204 lb 1.6 oz (92.6 kg)     Height: 5' 10\" (1.778 m)             Physical Examination: General appearance - alert, well appearing, and in no distress  Chest - clear to auscultation, no wheezes, rales or rhonchi, symmetric air entry  Heart - normal rate, regular rhythm, normal S1, S2, no murmurs, rubs, clicks or gallops  Musculoskeletal -cervical ROM: normal to all directions. Arms:  strength 5/5 full ROM to all directions. Extremities - No lower extremity edema       Assessment/ Plan:   Follow-up Disposition:  Return in about 1 month (around 8/24/2017) for bp. 1. Encounter for completion of form with patient      2. Essential hypertension    - LIPID PANEL    3. Screening for viral disease    - HEPATITIS C AB            I have discussed the diagnosis with the patient and the intended plan as seen in the above orders. The patient has received an after-visit summary and questions were answered concerning future plans. Pt conveyed understanding of plan.       Medication Side Effects and Warnings were discussed with patient: yes  Patient Labs were reviewed: yes  Patient Past Records were reviewed:  yes    Henna Hussein.  Brian Angelucci, NP

## 2017-07-25 ENCOUNTER — TELEPHONE (OUTPATIENT)
Dept: INTERNAL MEDICINE CLINIC | Age: 52
End: 2017-07-25

## 2017-07-25 NOTE — TELEPHONE ENCOUNTER
Left message  Was mistakenly under the impression that he had labs done at [facility] - no records of this.    Working on his form, but he needs lab work done please  He can come get this done at his convenience

## 2017-07-26 NOTE — TELEPHONE ENCOUNTER
Spoke to patient wife she stated he had labs done 6 month ago, I advise patient that he needs recent lab work for his DMV disability form, Pt wife states she will  the form.  Writer advise her form will be up front Kary Larson LPN

## 2017-08-15 DIAGNOSIS — E78.2 MIXED HYPERLIPIDEMIA: Primary | ICD-10-CM

## 2017-08-15 PROBLEM — E78.5 HYPERLIPIDEMIA: Status: ACTIVE | Noted: 2017-08-15

## 2017-08-15 RX ORDER — GUAIFENESIN 100 MG/5ML
81 LIQUID (ML) ORAL DAILY
Qty: 30 TAB | Refills: 3 | Status: SHIPPED | OUTPATIENT
Start: 2017-08-15

## 2017-08-15 RX ORDER — ATORVASTATIN CALCIUM 20 MG/1
20 TABLET, FILM COATED ORAL DAILY
Qty: 30 TAB | Refills: 3 | Status: SHIPPED | OUTPATIENT
Start: 2017-08-15

## 2017-08-31 DIAGNOSIS — I50.9 CHF (NYHA CLASS I, ACC/AHA STAGE B) (HCC): ICD-10-CM

## 2017-08-31 RX ORDER — LISINOPRIL 40 MG/1
40 TABLET ORAL DAILY
Qty: 30 TAB | Refills: 0 | Status: SHIPPED | COMMUNITY
Start: 2017-08-31

## 2020-11-08 ENCOUNTER — APPOINTMENT (OUTPATIENT)
Dept: GENERAL RADIOLOGY | Age: 55
End: 2020-11-08
Attending: EMERGENCY MEDICINE
Payer: MEDICARE

## 2020-11-08 ENCOUNTER — HOSPITAL ENCOUNTER (EMERGENCY)
Age: 55
Discharge: HOME | End: 2020-11-08
Attending: EMERGENCY MEDICINE
Payer: MEDICARE

## 2020-11-08 ENCOUNTER — APPOINTMENT (OUTPATIENT)
Dept: CT IMAGING | Age: 55
End: 2020-11-08
Attending: EMERGENCY MEDICINE
Payer: MEDICARE

## 2020-11-08 VITALS
WEIGHT: 209.44 LBS | RESPIRATION RATE: 16 BRPM | TEMPERATURE: 98.6 F | HEIGHT: 70 IN | OXYGEN SATURATION: 96 % | SYSTOLIC BLOOD PRESSURE: 118 MMHG | DIASTOLIC BLOOD PRESSURE: 72 MMHG | BODY MASS INDEX: 29.98 KG/M2 | HEART RATE: 86 BPM

## 2020-11-08 DIAGNOSIS — R47.81 SLURRED SPEECH: Primary | ICD-10-CM

## 2020-11-08 LAB
ALBUMIN SERPL-MCNC: 3.6 G/DL (ref 3.5–5)
ALBUMIN/GLOB SERPL: 0.9 {RATIO} (ref 1.1–2.2)
ALP SERPL-CCNC: 88 U/L (ref 45–117)
ALT SERPL-CCNC: 20 U/L (ref 12–78)
ANION GAP SERPL CALC-SCNC: 5 MMOL/L (ref 5–15)
APPEARANCE UR: CLEAR
AST SERPL-CCNC: 12 U/L (ref 15–37)
BACTERIA URNS QL MICRO: NEGATIVE /HPF
BASOPHILS # BLD: 0 K/UL (ref 0–0.1)
BASOPHILS NFR BLD: 1 % (ref 0–1)
BILIRUB SERPL-MCNC: 0.3 MG/DL (ref 0.2–1)
BILIRUB UR QL: NEGATIVE
BUN SERPL-MCNC: 9 MG/DL (ref 6–20)
BUN/CREAT SERPL: 8 (ref 12–20)
CALCIUM SERPL-MCNC: 9.1 MG/DL (ref 8.5–10.1)
CHLORIDE SERPL-SCNC: 107 MMOL/L (ref 97–108)
CK SERPL-CCNC: 50 U/L (ref 39–308)
CO2 SERPL-SCNC: 28 MMOL/L (ref 21–32)
COLOR UR: NORMAL
CREAT SERPL-MCNC: 1.13 MG/DL (ref 0.7–1.3)
DIFFERENTIAL METHOD BLD: ABNORMAL
EOSINOPHIL # BLD: 0.2 K/UL (ref 0–0.4)
EOSINOPHIL NFR BLD: 3 % (ref 0–7)
EPITH CASTS URNS QL MICRO: NORMAL /LPF
ERYTHROCYTE [DISTWIDTH] IN BLOOD BY AUTOMATED COUNT: 12.1 % (ref 11.5–14.5)
GLOBULIN SER CALC-MCNC: 3.8 G/DL (ref 2–4)
GLUCOSE BLD STRIP.AUTO-MCNC: 123 MG/DL (ref 65–100)
GLUCOSE SERPL-MCNC: 127 MG/DL (ref 65–100)
GLUCOSE UR STRIP.AUTO-MCNC: NEGATIVE MG/DL
HCT VFR BLD AUTO: 39.7 % (ref 36.6–50.3)
HGB BLD-MCNC: 13.4 G/DL (ref 12.1–17)
HGB UR QL STRIP: NEGATIVE
HYALINE CASTS URNS QL MICRO: NORMAL /LPF (ref 0–5)
IMM GRANULOCYTES # BLD AUTO: 0 K/UL (ref 0–0.04)
IMM GRANULOCYTES NFR BLD AUTO: 0 % (ref 0–0.5)
KETONES UR QL STRIP.AUTO: NEGATIVE MG/DL
LEUKOCYTE ESTERASE UR QL STRIP.AUTO: NEGATIVE
LYMPHOCYTES # BLD: 1.3 K/UL (ref 0.8–3.5)
LYMPHOCYTES NFR BLD: 15 % (ref 12–49)
MCH RBC QN AUTO: 32.1 PG (ref 26–34)
MCHC RBC AUTO-ENTMCNC: 33.8 G/DL (ref 30–36.5)
MCV RBC AUTO: 95.2 FL (ref 80–99)
MONOCYTES # BLD: 0.5 K/UL (ref 0–1)
MONOCYTES NFR BLD: 5 % (ref 5–13)
NEUTS SEG # BLD: 6.5 K/UL (ref 1.8–8)
NEUTS SEG NFR BLD: 76 % (ref 32–75)
NITRITE UR QL STRIP.AUTO: NEGATIVE
NRBC # BLD: 0 K/UL (ref 0–0.01)
NRBC BLD-RTO: 0 PER 100 WBC
PH UR STRIP: 7.5 [PH] (ref 5–8)
PLATELET # BLD AUTO: 239 K/UL (ref 150–400)
PMV BLD AUTO: 9.6 FL (ref 8.9–12.9)
POTASSIUM SERPL-SCNC: 4.1 MMOL/L (ref 3.5–5.1)
PROT SERPL-MCNC: 7.4 G/DL (ref 6.4–8.2)
PROT UR STRIP-MCNC: NEGATIVE MG/DL
RBC # BLD AUTO: 4.17 M/UL (ref 4.1–5.7)
RBC #/AREA URNS HPF: NORMAL /HPF (ref 0–5)
SERVICE CMNT-IMP: ABNORMAL
SODIUM SERPL-SCNC: 140 MMOL/L (ref 136–145)
SP GR UR REFRACTOMETRY: 1.02 (ref 1–1.03)
TROPONIN I SERPL-MCNC: <0.05 NG/ML
UA: UC IF INDICATED,UAUC: NORMAL
UROBILINOGEN UR QL STRIP.AUTO: 0.2 EU/DL (ref 0.2–1)
WBC # BLD AUTO: 8.6 K/UL (ref 4.1–11.1)
WBC URNS QL MICRO: NORMAL /HPF (ref 0–4)

## 2020-11-08 PROCEDURE — 70450 CT HEAD/BRAIN W/O DYE: CPT

## 2020-11-08 PROCEDURE — 80053 COMPREHEN METABOLIC PANEL: CPT

## 2020-11-08 PROCEDURE — 85025 COMPLETE CBC W/AUTO DIFF WBC: CPT

## 2020-11-08 PROCEDURE — 82962 GLUCOSE BLOOD TEST: CPT

## 2020-11-08 PROCEDURE — 84484 ASSAY OF TROPONIN QUANT: CPT

## 2020-11-08 PROCEDURE — 81001 URINALYSIS AUTO W/SCOPE: CPT

## 2020-11-08 PROCEDURE — 36415 COLL VENOUS BLD VENIPUNCTURE: CPT

## 2020-11-08 PROCEDURE — 99285 EMERGENCY DEPT VISIT HI MDM: CPT

## 2020-11-08 PROCEDURE — 82550 ASSAY OF CK (CPK): CPT

## 2020-11-08 PROCEDURE — 71046 X-RAY EXAM CHEST 2 VIEWS: CPT

## 2020-11-08 PROCEDURE — 93005 ELECTROCARDIOGRAM TRACING: CPT

## 2020-11-08 NOTE — ED NOTES
Patient given printed discharge instructions reviewed by the MD. Patient understands instructions/follow up recommendations. Patient ambulated out of ED on own with wife at side.

## 2020-11-09 LAB
ATRIAL RATE: 95 BPM
CALCULATED P AXIS, ECG09: 40 DEGREES
CALCULATED R AXIS, ECG10: 38 DEGREES
CALCULATED T AXIS, ECG11: 60 DEGREES
DIAGNOSIS, 93000: NORMAL
P-R INTERVAL, ECG05: 144 MS
Q-T INTERVAL, ECG07: 374 MS
QRS DURATION, ECG06: 86 MS
QTC CALCULATION (BEZET), ECG08: 469 MS
VENTRICULAR RATE, ECG03: 95 BPM

## 2020-11-09 NOTE — ED PROVIDER NOTES
EMERGENCY DEPARTMENT HISTORY AND PHYSICAL EXAM      Date: 11/8/2020  Patient Name: Jermaine Emmanuel    History of Presenting Illness     Chief Complaint   Patient presents with    Dysarthria     Pt had a GLF on thursday and hit head, which pt does not remember. Pt family states since this fall the pt has appeared \"sleepy\" or lethargic, and slurred speech.  Altered mental status     pt also having AMS episodes at home, doing abnormal things at home. pt BG in triage was WNL. Pt NIH in triage score is 0. History Provided By: Patient and Patient's Wife    HPI: Jermaine Emmanuel, 53 y.o. male presents to the ED with cc of slurred speech. Pt fell several days ago and at the time he hit his head. He states he got immediately following the fall and there was no loss of consciousness. He denies any neck or back pain. Per the wife since the fall he has been more sleepy and has seemed confused at times. Over the past day he has had slurred speech. Today his speech seemed to be more slurred today and times she and her daughter could not understand anything what he was saying. There had been no facial asymmetry at the time and there was no loss of strength or sensation. At the time in the ED pt with no confusion or speech issues. Pt states he remembers the events and does not feel like he was acting any differently and was speaking fine. He states over the past few days he has just been very tired. He denies any chest pain or shortness of breath. He denies any fever or chills, and there has been no cough or cold symptoms. He denies any dizziness, n/v. There are no other complaints, changes, or physical findings at this time. PCP: Destiney Cantrell., NP    No current facility-administered medications on file prior to encounter. Current Outpatient Medications on File Prior to Encounter   Medication Sig Dispense Refill    lisinopril (PRINIVIL, ZESTRIL) 40 mg tablet Take 1 Tab by mouth daily.  30 Tab 0    atorvastatin (LIPITOR) 20 mg tablet Take 1 Tab by mouth daily. 30 Tab 3    aspirin 81 mg chewable tablet Take 1 Tab by mouth daily. 30 Tab 3    oxyCODONE-acetaminophen (PERCOCET 10)  mg per tablet 1 Tab two (2) times daily as needed.  spironolactone (ALDACTONE) 25 mg tablet       carvedilol (COREG) 25 mg tablet Take 2 Tabs by mouth two (2) times daily (with meals). 120 Tab 5       Past History     Past Medical History:  Past Medical History:   Diagnosis Date    Congestive heart failure, NYHA class II ([facility] [address] 6/2014    stage b    Depression     depression    DJD (degenerative joint disease), cervical     Sleep disorder     no longer needs cpap       Past Surgical History:  Past Surgical History:   Procedure Laterality Date    HX IMPLANTABLE CARDIOVERTER DEFIBRILLATOR      2015 at [facility] [place]    HX ORTHOPAEDIC       C5-6 disc fusion and thumb surgery       Family History:  Family History   Problem Relation Age of Onset    Heart Disease Mother     Heart Disease Brother        Social History:  Social History     Tobacco Use    Smoking status: Never Smoker    Smokeless tobacco: Never Used   Substance Use Topics    Alcohol use: No     Comment: occassionally    Drug use: No       Allergies: Allergies   Allergen Reactions    Shellfish Containing Products Hives and Anaphylaxis    Iodine Swelling         Review of Systems   Review of Systems   Constitutional: Negative. Negative for appetite change, chills, fatigue and fever. HENT: Negative. Negative for congestion, rhinorrhea, sinus pressure and sore throat. Eyes: Negative. Respiratory: Negative. Negative for cough, choking, chest tightness, shortness of breath and wheezing. Cardiovascular: Negative. Negative for chest pain, palpitations and leg swelling. Gastrointestinal: Negative for abdominal pain, constipation, diarrhea, nausea and vomiting. Endocrine: Negative. Genitourinary: Negative.   Negative for difficulty urinating, dysuria, flank pain and urgency. Musculoskeletal: Negative. Skin: Negative. Neurological: Positive for speech difficulty. Negative for dizziness, weakness, light-headedness, numbness and headaches. Confusion     Psychiatric/Behavioral: Negative. All other systems reviewed and are negative. Physical Exam   Physical Exam  Vitals signs and nursing note reviewed. Constitutional:       General: He is not in acute distress. Appearance: He is well-developed. He is not diaphoretic. HENT:      Head: Normocephalic and atraumatic. Mouth/Throat:      Pharynx: No oropharyngeal exudate. Eyes:      Conjunctiva/sclera: Conjunctivae normal.      Pupils: Pupils are equal, round, and reactive to light. Neck:      Musculoskeletal: Normal range of motion and neck supple. Vascular: No JVD. Trachea: No tracheal deviation. Cardiovascular:      Rate and Rhythm: Normal rate and regular rhythm. Heart sounds: Normal heart sounds. No murmur. Pulmonary:      Effort: Pulmonary effort is normal. No respiratory distress. Breath sounds: Normal breath sounds. No stridor. No wheezing or rales. Chest:      Chest wall: No tenderness. Abdominal:      General: There is no distension. Palpations: Abdomen is soft. Tenderness: There is no abdominal tenderness. There is no guarding or rebound. Musculoskeletal: Normal range of motion. General: No tenderness. Skin:     General: Skin is warm and dry. Neurological:      Mental Status: He is alert and oriented to person, place, and time. Cranial Nerves: No cranial nerve deficit, dysarthria or facial asymmetry. Sensory: No sensory deficit. Motor: No weakness.       Coordination: Coordination normal.      Comments: No focal motor or sensory deficits    Psychiatric:         Behavior: Behavior normal.         Diagnostic Study Results     Labs -     URINALYSIS W/ REFLEX CULTURE (Final result)    Component (Lab Inquiry) Collection Time  Result Time  COLOR  APPRN  REFSG  RILEY  PROTU    11/08/20 15:59:00  11/08/20 16:23:06  YELLOW/STRAW   Color Reference Range:. .. CLEAR  1.022  7.5  Negative         Collection Time  Result Time  GLUCU  KETU  BILU  BLDU  UROU    11/08/20 15:59:00  11/08/20 16:23:06  Negative  Negative  Negative  Negative  0.2         Collection Time  Result Time  CYNTHIA  LEUKU  WBCU  RBCU  EPSU    11/08/20 15:59:00  11/08/20 16:23:06  Negative  Negative  0-4  0-5  FEW   Epithelial cell catego. ..         Collection Time  Result Time  Bary Ping  HYCST    11/08/20 15:59:00  11/08/20 16:23:06  Negative  CULTURE NOT INDICATED BY UA RESULT  0-2           Final result                             Contains abnormal data  CBC WITH AUTOMATED DIFF (Final result)    Component (Lab Inquiry)   Collection Time  Result Time  WBC  RBC  HGB  HCT  MCV    11/08/20 15:04:00  11/08/20 15:21:18  8.6  4.17  13.4  39.7  95.2         Collection Time  Result Time  MCH  MCHC  RDW  PLT  MPLV    11/08/20 15:04:00  11/08/20 15:21:18  32.1  33.8  12.1  239  9.6         Collection Time  Result Time  NRBC  ANRBC  GRANS  LYMPH  MONOS    11/08/20 15:04:00  11/08/20 15:21:18  0.0  0.00  76High    15  5         Collection Time  Result Time  EOS  BASOS  IG  ABG  ABL    11/08/20 15:04:00  11/08/20 15:21:18  3  1  0  6.5  1.3         Collection Time  Result Time  ABM  JEET  ABB  AIG  DF    11/08/20 15:04:00  11/08/20 15:21:18  0.5  0.2  0.0  0.0  AUTOMATED         Final result                           Contains abnormal data  METABOLIC PANEL, COMPREHENSIVE (Final result)    Component (Lab Inquiry)   Collection Time  Result Time  NA  K  CL  CO2  AGAP    11/08/20 15:04:00  11/08/20 15:45:08  140  4.1  107  28  5         Collection Time  Result Time  GLU  BUN  CREA  BUCR  GFRAA    11/08/20 15:04:00  11/08/20 15:45:08  127High    9  1.13  8Low    >60         Collection Time  Result Time  GFRNA  CA  TBIL  GPT  SGOT    11/08/20 15:04:00  11/08/20 15:45:08  >60 Estimated GFR is calcu. ..  9.1  0.3  20  12Low           Collection Time  Result Time  AP  TP  ALB  GLOB  AGRAT    11/08/20 15:04:00  11/08/20 15:45:08  88  7.4  3.6  3.8  0.9Low           Final result                           TROPONIN I (Final result)    Component (Lab Inquiry)   Collection Time  Result Time  TROIQ    11/08/20 15:04:00  11/08/20 15:45:08  <0.05   The presence of detect. ..           Final result                             CK W/ REFLX CKMB (Final result)    Component (Lab Inquiry)   Collection Time  Result Time  CKELE    11/08/20 15:04:00  11/08/20 15:45:08  50   NOT ELEVATED,CKMB-QUANT NOT PERFORMED           Final result                             Contains abnormal data  GLUCOSE, POC (Final result)    Component (Lab Inquiry)   Collection Time  Result Time  [place]  TECHID    11/08/20 14:52:00  11/08/20 14:53:03  123   (NOTE)   [employer] . Renzo Vega RN         Final result                          Radiologic Studies -   CT HEAD WO CONT   Final Result   IMPRESSION: No acute intracranial changes. XR CHEST PA LAT   Final Result   IMPRESSION: No acute changes. CT Results  (Last 48 hours)               11/08/20 1517  CT HEAD WO CONT Final result    Impression:  IMPRESSION: No acute intracranial changes. Narrative:  EXAM: CT HEAD WO CONT       INDICATION: fall, hit head, AMS       COMPARISON: None. CONTRAST: None. TECHNIQUE: Unenhanced CT of the head was performed using 5 mm images. Brain and   bone windows were generated. Coronal and sagittal reformats. CT dose reduction   was achieved through use of a standardized protocol tailored for this   examination and automatic exposure control for dose modulation. FINDINGS:   There is no extra-axial fluid collection hemorrhage shift or masses. Incidental paranasal sinus disease.                CXR Results  (Last 48 hours)               11/08/20 1512  XR CHEST PA LAT Final result    Impression:  IMPRESSION: No acute changes. Narrative:  Clinical indication: Shortness of breath. Frontal and lateral views of the chest obtained, comparison October 15, 2009. The a heart size is normal. A cardiac pacemaker is in place. No acute   infiltrate. Medical Decision Making   I am the first provider for this patient. I reviewed the vital signs, available nursing notes, past medical history, past surgical history, family history and social history. Vital Signs-Reviewed the patient's vital signs. Patient Vitals for the past 12 hrs:   Pulse Resp BP SpO2   11/08/20 1700 86 16 118/72 96 %   11/08/20 1630   (!) 118/59 99 %       EKG interpretation: (Preliminary)  NSR, rate 95, normal axis/pr/qrs, no acute ST changes, Adria Olivas DO      Records Reviewed: Nursing Notes, Old Medical Records, Previous Radiology Studies and Previous Laboratory Studies    Provider Notes (Medical Decision Making):   DDx- Post concussive syndrome, CVA, TIA, electrolyte abnormality. ED Course:   Initial assessment performed. The patients presenting problems have been discussed, and they are in agreement with the care plan formulated and outlined with them. I have encouraged them to ask questions as they arise throughout their visit. Discussed results with pt and his wife. Here in the ED there has been no reoccurrence of symptoms whil he has been here. Discussed possibility of post-concussive syndrome v. TIA. Discussed possible admission for TIA work-up, however pt wishes to decline. This was discussed with his wife as well. Discussed to consider ASA therapy and follow-up with his PCP for outpt carotid doppler. Discussed smoking cessation as this could could contribute or put him at risk of stroke. Disposition:  DC home      DISCHARGE PLAN:  1. Discharge Medication List as of 11/8/2020  6:27 PM        2.    Follow-up Information     Follow up With Specialties Details Why Contact Info    Eirka Montalvo WES, NP Nurse Practitioner   [facility]  [phone]  [phone]          3. Return to ED if worse     Diagnosis     Clinical Impression:   1. Slurred speech    2. Post concussive syndrome    Attestations:    Xiomy Kovacs, DO    Please note that this dictation was completed with [employer], the computer voice recognition software. Quite often unanticipated grammatical, syntax, homophones, and other interpretive errors are inadvertently transcribed by the computer software. Please disregard these errors. Please excuse any errors that have escaped final proofreading. Thank you.

## 2020-12-14 ENCOUNTER — TRANSCRIBE ORDER (OUTPATIENT)
Dept: SCHEDULING | Age: 55
End: 2020-12-14

## 2020-12-14 DIAGNOSIS — N63.0 BREAST LUMP: Primary | ICD-10-CM

## 2020-12-14 DIAGNOSIS — N63.24 UNSPECIFIED LUMP IN THE LEFT BREAST, LOWER INNER QUADRANT: ICD-10-CM

## 2020-12-14 DIAGNOSIS — N63.0 BREAST MASS IN MALE: Primary | ICD-10-CM

## 2020-12-14 DIAGNOSIS — N63.0 BREAST MASS: Primary | ICD-10-CM

## 2022-03-19 PROBLEM — E78.5 HYPERLIPIDEMIA: Status: ACTIVE | Noted: 2017-08-15

## 2023-05-14 RX ORDER — LISINOPRIL 40 MG/1
TABLET ORAL DAILY
COMMUNITY
Start: 2017-08-31

## 2023-05-14 RX ORDER — OXYCODONE AND ACETAMINOPHEN 10; 325 MG/1; MG/1
1 TABLET ORAL 2 TIMES DAILY PRN
COMMUNITY
Start: 2017-06-01

## 2023-05-14 RX ORDER — SPIRONOLACTONE 25 MG/1
TABLET ORAL
COMMUNITY
Start: 2017-05-05

## 2023-05-14 RX ORDER — ATORVASTATIN CALCIUM 20 MG/1
TABLET, FILM COATED ORAL DAILY
COMMUNITY
Start: 2017-08-15

## 2023-05-14 RX ORDER — ASPIRIN 81 MG/1
TABLET, CHEWABLE ORAL DAILY
COMMUNITY
Start: 2017-08-15

## 2023-05-14 RX ORDER — CARVEDILOL 25 MG/1
TABLET ORAL 2 TIMES DAILY WITH MEALS
COMMUNITY
Start: 2016-01-06